# Patient Record
(demographics unavailable — no encounter records)

---

## 2025-02-04 NOTE — HISTORY OF PRESENT ILLNESS
[Home] : at home, [unfilled] , at the time of the visit. [Other Location: e.g. Home (Enter Location, City,State)___] : at [unfilled] [Verbal consent obtained from patient] : the patient, [unfilled] [FreeTextEntry8] : 34 y F multiple reconstructive sinus surgeries, family history of stroke in younger brother at the age of 26 (unclear etiology, but suspectedto be caused by COVID-19), recently d/c from [facility] 1/30 after workup for for OS amaurosis fugax/complex migraine/possible TIA, imaging including MRI/CT-A/TTE nonactionable, started on baby ASA, improving clinical course inpatient [facility] 1/29 noted to be +COVID Hypercoagulability w/u shows very mildly elevated coags and mildly reduced Protein S Pt calling to discuss lab result Separately, patient has been experiencing RLE "heaviness" over the last few months, intermittent, seems most prominent when sitting for a long period of times or when she has just had a run.  Feels it mildly right earlier today but not currently.  No changes to sensation, no motor weakness, extremity is never cool to touch.  Feels symptoms in buttock/posterior thigh.  No saddle anesthesia or b/b changes.  No changes to her gait.  No calf/foot pain.  RLE is not swollen.

## 2025-02-04 NOTE — PLAN
[FreeTextEntry1] : C/W ASA 81 Neuro appt on 2/13 States plan in place with PCP to repeat labs and decide utility of heme f/u from there Strict ED precautions discussed

## 2025-02-04 NOTE — PHYSICAL EXAM
[de-identified] : PLEASE SEE HPI FOR ADDITIONAL RELEVANT PHYSICAL EXAM FINDINGS GENERAL: no acute distress, nontoxic HEAD: normocephalic EYES: no scleral icterus NECK: trachea midline, Full ROM RESP: no respiratory distress ABD: nondistended MSK: no gross deformity NEURO: alert & fully oriented SKIN: no rash PSYCH: cooperative, good insight, appropriate, fluent speech

## 2025-02-04 NOTE — ASSESSMENT
[FreeTextEntry1] : Mild lab abnormalities as above; resolved ophth changes, subacute RLE sensation intermittent/positional seems more associated with sciatica than vascular insufficiency.  Asymptomatic currently.  No indication for referral to ED at this time.

## 2025-02-07 NOTE — ASSESSMENT
[FreeTextEntry1] : 35yo with PMH of multiple reconstructive sinus surgery, recent admission for OS amaurosis fugax/complex migraine/possible TIA, presents for initial evaluation of thyroid nodules and abnormal TFTs.   #Hypothyroid vs Abnormal TFTs in setting of viral illness - Asymptomatic - TSH: 4.47, Free T4: 0.912 in hospital while + for Covid.  - Family Hx of thyroid cancer and hypo/ hyper thyroid  Plan: - Repeat TFTs  - thyroid antibodies  - will call patient with results   #Left Sub centimeter thyroid Nodule - No overt nodule appreciated on physical exam, thyroid not enlarged  - thyroid US   RTC in 3 months or 1 year depending on labs/ US  Iris Teague Endocrinology Fellow  Discussed with Dr. Aragon

## 2025-02-07 NOTE — END OF VISIT
[] : Fellow [FreeTextEntry3] : 34yoF h/o multiple reconstructive sinus surgeries, admitted in Jan 2025 for acute vision loss thought to be due to TIA vs. amaurosis fugax. CTA neck showed a left subcentimeter thyroid nodule. TSH 4.74, free T4 0.912 inpatient. Her grandmother had thyroid cancer.   Exam today shows a well-appearing young woman. Thyroid was not enlarged and there were no palpable nodules.   A/P: Agree with checking thyroid US and rechecking TFTs.

## 2025-02-07 NOTE — PHYSICAL EXAM
[Alert] : alert [No Acute Distress] : no acute distress [Normal Sclera/Conjunctiva] : normal sclera/conjunctiva [Normal Hearing] : hearing was normal [No Neck Mass] : no neck mass was observed [No LAD] : no lymphadenopathy [Thyroid Not Enlarged] : the thyroid was not enlarged [No Respiratory Distress] : no respiratory distress [No Accessory Muscle Use] : no accessory muscle use [Clear to Auscultation] : lungs were clear to auscultation bilaterally [Normal S1, S2] : normal S1 and S2 [Regular Rhythm] : with a regular rhythm [No Edema] : no peripheral edema [Normal Bowel Sounds] : normal bowel sounds [Soft] : abdomen soft [No Stigmata of Cushings Syndrome] : no stigmata of Cushings Syndrome [Normal Gait] : normal gait [No Joint Swelling] : no joint swelling seen [Oriented x3] : oriented to person, place, and time

## 2025-02-07 NOTE — HISTORY OF PRESENT ILLNESS
[FreeTextEntry1] : 35yo with PMH of multiple reconstructive sinus surgery, recent admission for OS amaurosis fugax/complex migraine/possible TIA, presents for initial evaluation of thyroid nodules and abnormal TFTs.   She was admitted at [facility] for brief vision loss that self- resolved suspected to be amaurosis fugax/complex migraine/possible TIA with MRI/ CTH/ CTA unremarkable. Started on aspirin 81mg qd. Covid positive.   Symptoms: No significant weight changes, no cold intolerance, no palpitations, hair loss 2 years ago but not anymore, denies lethargy.   In the hospital: 1/30/25 a1c 4.8 TSH: 4.4740  Free T4: 0.912 Lipid profile: LDL 58 On CTA noted to have: Sub centimeter left thyroid nodule. Size not quantified.   Family History: Grandmother paternal thyroid cancer s/p thryoidectomy, Uncle with pre-cacnerous s/p thyroidectomy, Aunts with hyper and hypo. Father with no thyroid issues.  Brother: Cryptogenic stroke  Sister: Celiac   Social:  for tech company Alcohol: denies Smoking denies:  Medications: baby aspirin  no vitamins over the counter, occasionally vitamin c or zinc

## 2025-02-07 NOTE — REVIEW OF SYSTEMS
[Fatigue] : no fatigue [Decreased Appetite] : appetite not decreased [Dry Eyes] : no dryness [Dysphagia] : no dysphagia [Neck Pain] : no neck pain [Dysphonia] : no dysphonia [Nasal Congestion] : no nasal congestion [Chest Pain] : no chest pain [Slow Heart Rate] : heart rate is not slow [Palpitations] : no palpitations [Fast Heart Rate] : heart rate is not fast [Shortness Of Breath] : no shortness of breath [Cough] : no cough [Nausea] : no nausea [Constipation] : no constipation [Vomiting] : no vomiting [Diarrhea] : no diarrhea [Joint Pain] : no joint pain [Muscle Weakness] : no muscle weakness [Acanthosis] : no acanthosis  [Acne] : no acne [Dry Skin] : no dry skin [Hirsutism] : no hirsutism [Hair Loss] : no hair loss [Ulcer] : no ulcer [Headaches] : no headaches [Tremors] : no tremors [Polydipsia] : no polydipsia [Cold Intolerance] : no cold intolerance [Heat Intolerance] : no heat intolerance [Hot Flashes] : no hot flashes

## 2025-02-12 NOTE — HISTORY OF PRESENT ILLNESS
[FreeTextEntry2] : Patient is a 34YF with PMH of reconstructive sinus surgery (all R sided) and HSV 1 (gets cold sores) she is presenting after hospitalization for TIA/complex migraine workup. She went to the hospital at the end of Jan after an episode of vision loss in her L eye while watching TV. TTE with bubble study was negative, Coagulopathy workup was sent. She was found to be COVID positive at the hospital. She initially went to the hospital as she was concerned for stroke as her younger brother suffered a stroke at 26 years old and was incidentally also covid positive at the time. Today she reports some neck stiffness from sleeping in an odd position. Denies headache, vision loss, floaters, weakness, N/V/D/C or abdominal pain.  FH: Mom: diabetes; colon polypectomies in her late 30s Paternal grandfather: passed of CRC in his 50s father side: NA    pt is here post hospitalization discharge follow u[. Sje

## 2025-02-12 NOTE — PHYSICAL EXAM
[No Acute Distress] : no acute distress [Well Nourished] : well nourished [Well Developed] : well developed [Well-Appearing] : well-appearing [EOMI] : extraocular movements intact [No JVD] : no jugular venous distention [No Respiratory Distress] : no respiratory distress  [Normal] : normal rate, regular rhythm, normal S1 and S2 and no murmur heard [No Edema] : there was no peripheral edema [Soft] : abdomen soft [Non Tender] : non-tender [Non-distended] : non-distended [No Masses] : no abdominal mass palpated [No HSM] : no HSM [Normal Bowel Sounds] : normal bowel sounds [Grossly Normal Strength/Tone] : grossly normal strength/tone [Coordination Grossly Intact] : coordination grossly intact [No Focal Deficits] : no focal deficits [Normal Gait] : normal gait [Normal Affect] : the affect was normal [Normal Insight/Judgement] : insight and judgment were intact [de-identified] : 5/5 strength testing in b/l UE and LE

## 2025-02-12 NOTE — END OF VISIT
[] : Resident [FreeTextEntry3] : Admitted  discharged . Recent episode of visual disturbance which raised concern for stroke. UC -> ED -> admit for hypercoag workup. Also found to be COVID+. Discharged on aspirin and sent to endo for eval of incidental thyroid nodule.  FHx early stroke in her brother at age 26. Will get lp(a) eventually given this FHx but no need to draw this now. Also FHx of many types of cancer including colorectal and thyroid but no genetic cause identified. Chronic headaches thought to be related to sinus surgeries -- currently at baseline. She is staying on aspirin until following up with neuro, which seems reasonable to me. Her headaches are better which could be due to mild anti-inflammatory from aspirin. LDL<70 and no clear reason to push for LDL<55.  HCM -- UTD on cervical cancer screening and vaccines. GI referral given mother with colonic polyps and MGF  of colon cancer

## 2025-02-12 NOTE — REVIEW OF SYSTEMS
[Fever] : no fever [Chills] : no chills [Pain] : no pain [Vision Problems] : no vision problems [Earache] : no earache [Chest Pain] : no chest pain [Palpitations] : no palpitations [Wheezing] : no wheezing [Cough] : no cough [Abdominal Pain] : no abdominal pain [Nausea] : no nausea [Constipation] : no constipation [Diarrhea] : diarrhea [Vomiting] : no vomiting [Dysuria] : no dysuria [Headache] : no headache [Dizziness] : no dizziness [FreeTextEntry9] : R leg jheaviness and tightness around hamstring

## 2025-02-12 NOTE — PHYSICAL EXAM
[No Acute Distress] : no acute distress [Well Nourished] : well nourished [Well Developed] : well developed [Well-Appearing] : well-appearing [EOMI] : extraocular movements intact [No JVD] : no jugular venous distention [No Respiratory Distress] : no respiratory distress  [Normal] : normal rate, regular rhythm, normal S1 and S2 and no murmur heard [No Edema] : there was no peripheral edema [Soft] : abdomen soft [Non Tender] : non-tender [Non-distended] : non-distended [No Masses] : no abdominal mass palpated [No HSM] : no HSM [Normal Bowel Sounds] : normal bowel sounds [Grossly Normal Strength/Tone] : grossly normal strength/tone [Coordination Grossly Intact] : coordination grossly intact [No Focal Deficits] : no focal deficits [Normal Gait] : normal gait [Normal Affect] : the affect was normal [Normal Insight/Judgement] : insight and judgment were intact [de-identified] : 5/5 strength testing in b/l UE and LE

## 2025-02-12 NOTE — ASSESSMENT
[FreeTextEntry1] :   Case discussed with Dr. Christiansen RTC in 3 months for follow-up to review workup for TIA/complex migraine

## 2025-02-12 NOTE — HEALTH RISK ASSESSMENT
[Monthly or less (1 pt)] : Monthly or less (1 point) [Never (0 pts)] : Never (0 points) [Patient reported PAP Smear was normal] : Patient reported PAP Smear was normal [Fully functional (bathing, dressing, toileting, transferring, walking, feeding)] : Fully functional (bathing, dressing, toileting, transferring, walking, feeding) [Never] : Never [PapSmearDate] : 2024

## 2025-02-12 NOTE — REASON FOR VISIT
I printed order  
Order faxed to [facility] #[phone]  
PATIENT CALLED AND STATED SHE IS REQUESTING TO SEE  PHYSICAL THERAPIST CRISTOFER GALLAGHER   THERAPIST IS AT [place] [facility]  
[Post Hospitalization] : a post hospitalization visit
[Post Hospitalization] : a post hospitalization visit

## 2025-02-13 NOTE — HISTORY OF PRESENT ILLNESS
[FreeTextEntry1] : Diana Santos is 34-year-old Female with PMH of multiple reconstructive sinus surgeries, family history of stroke in younger brother at the age of 26 (unclear etiology but suspected to be caused by COVID-19) presented to the ED for evaluation of L eye vision loss for 30-45 minutes followed by R temporal headache. HCT negative for hemorrhage and territorial infarct. CTA head and neck negative for stenosis, occlusion, and dissection. Patient admitted for stroke work up. Course complicated by panic attack causing L hand and tongue tingling. Repeated CTH is negative for stroke and hemorrhage. MRI negative for acute stroke. TTE completed and negative for PFO and thrombus. Hypercoagulability work-up negative. Patient presents to the office for post-hospitalization follow-up.  Hospital Course: January 29-January 30, 2025, Seen by Dr. Roach Imaging: CT Head: Negative for acute stroke and hemorrhage MR Head Non-Contrast: negative for stroke CT Angio Head: negative for dissection, aneurysm, stenosis and occlusion. CT Angio Neck: negative for dissection, aneurysm, stenosis and occlusion. Echo: no PFO or thrombus. LVEF: 60% Hospital labs    - LDL: 58, A1C: 4.8, TSH: 4.740    - hypercoag panel: negative, mildly decreased protein S, Covid +  Stroke symptoms: patient reports lying in bed watching TV, sudden onsent of left blurry vision. She checked both eyes and only left side was blurry. She turned the light on and off and this lasted for 30-45 minutes then she had a sudden loss of vision, seeing black in her left eye correction which only last few minutes then a headache.   Since discharge, patient reports doing well. No reports of new neurological symptoms. She is tolerating and compliant with her ASA daily. She is very active and eats a healthy diet. She was never a smoker, rarely drinks alcohol and does not use recreational drugs. Her BP today is 112/79. She reports good sleep, no snoring noted. She recently followed up for her abnormal TFTs, which were repeated and back to normal. She has some congestion left but pretty much feeling better. She has a hx of sinus surgeries and chronic sinusitis. She does have frequent frontal forehead headaches from the sinus issues, but she has never had visual symptoms with it.  Family History: brother stroke age 26 (unable to figure out cause, possibly d/t covid in 12/2019), colon cancer mom side, breast cancer dad side, sister has celiac, paternal grandfather 2 MI's, paternal uncle stroke late 50s, mother had 2 miscarriages (during 1st trimester)

## 2025-02-13 NOTE — PHYSICAL EXAM
[FreeTextEntry1] : Alert. Fully oriented. Speech and language are intact. Cranial nerves II-XII are intact. Motor exam reveals intact strength with individual muscle testing in bilateral upper and lower extremities. Tone is normal. Reflexes are normal. Sensation is intact to light touch in distal extremities. Finger-to-nose and heel-to-shin are intact. Rapid alternating movements are normal in the upper and lower extremities. Gait is normal. Tandem walk intact. Romberg negative

## 2025-02-13 NOTE — HISTORY OF PRESENT ILLNESS
[FreeTextEntry1] : Diana Santos is 34-year-old Female with PMH of multiple reconstructive sinus surgeries, family history of stroke in younger brother at the age of 26 (unclear etiology but suspected to be caused by COVID-19) presented to the ED for evaluation of L eye vision loss for 30-45 minutes followed by R temporal headache. HCT negative for hemorrhage and territorial infarct. CTA head and neck negative for stenosis, occlusion, and dissection. Patient admitted for stroke work up. Course complicated by panic attack causing L hand and tongue tingling. Repeated CTH is negative for stroke and hemorrhage. MRI negative for acute stroke. TTE completed and negative for PFO and thrombus. Hypercoagulability work-up negative. Patient presents to the office for post-hospitalization follow-up.  Hospital Course: January 29-January 30, 2025, Seen by Dr. Roach Imaging: CT Head: Negative for acute stroke and hemorrhage MR Head Non-Contrast: negative for stroke CT Angio Head: negative for dissection, aneurysm, stenosis and occlusion. CT Angio Neck: negative for dissection, aneurysm, stenosis and occlusion. Echo: no PFO or thrombus. LVEF: 60% Hospital labs    - LDL: 58, A1C: 4.8, TSH: 4.740    - hypercoag panel: negative, mildly decreased protein S, Covid +  Stroke symptoms: patient reports lying in bed watching TV, sudden onsent of left blurry vision. She checked both eyes and only left side was blurry. She turned the light on and off and this lasted for 30-45 minutes then she had a sudden loss of vision, seeing black in her left eye skilled nursing which only last few minutes then a headache.   Since discharge, patient reports doing well. No reports of new neurological symptoms. She is tolerating and compliant with her ASA daily. She is very active and eats a healthy diet. She was never a smoker, rarely drinks alcohol and does not use recreational drugs. Her BP today is 112/79. She reports good sleep, no snoring noted. She recently followed up for her abnormal TFTs, which were repeated and back to normal. She has some congestion left but pretty much feeling better. She has a hx of sinus surgeries and chronic sinusitis. She does have frequent frontal forehead headaches from the sinus issues, but she has never had visual symptoms with it.  Family History: brother stroke age 26 (unable to figure out cause, possibly d/t covid in 12/2019), colon cancer mom side, breast cancer dad side, sister has celiac, paternal grandfather 2 MI's, paternal uncle stroke late 50s, mother had 2 miscarriages (during 1st trimester)

## 2025-02-13 NOTE — ASSESSMENT
[FreeTextEntry1] : Diana Santos is 34-year-old Female with PMH of multiple reconstructive sinus surgeries, family history of stroke in younger brother at the age of 26 (unclear etiology but suspected to be caused by COVID-19) presented to the ED for evaluation of L eye vision loss for 30-45 minutes followed by R temporal headache. Stroke work-up negative (MRI, CTH, CTA H/N, TTE w/ bubble and hypercoag negative). Patient presents to the office for post-hospitalization follow-up. Etiology of symptoms suspicious of TIA, especially with visual loss in one eye.   Plan: -Continue current medication regimen for secondary stroke prevention, monotherapy with ASA indefinitely -Repeat stroke labs in 3 months -F/u with hematology for hypercoag workup (especially with family hx) -F/u PCP/endocrine for thyroid mass -Continue aggressive vascular risk factor control with PCP, BP goal <130/80, LDL <70. -Counselled on healthy eating (DASH/Mediterranean diet, limiting red meats and fried foods) -Counselled on importance of regular exercise and remaining active -Counselled on f/u with PCP regarding regular health maintenance and prevention, including routine screening -Counselled on signs of stroke BEFAST and to call 911 with any new or worsening neurological symptoms -RTC in 3 months with Dr. Roach

## 2025-03-10 NOTE — HISTORY OF PRESENT ILLNESS
[de-identified] : 34F with medical history of sinsitis 1/29/25 with concern for stroke Run 8-9miles on Sunday No hx of VTEs No recent hx OCPs  COVID while at [facility].    Family hx: brother (major stroke in 2019 age 29- workup done negative), dad cousin (stroke at 58) Surgery: sinus surgery Medications: aspirin, allegra PRN

## 2025-03-27 NOTE — HISTORY OF PRESENT ILLNESS
[FreeTextEntry1] : 35 yo F with covid infection, multiple reconstructive sinus surgeries, amaurosis fugax, TIA and family history of stroke who presents for EP evaluation.   Patient was admitted in Jan 2025 with L eye vision loss for 30-45% followed by headache. CTH, CTA and MRI brain were all negative for stroke. Course was complicated by panic attack causing L hand and tongue tingling which then resolved. TTE from Jan 2025 showed normal structure, EF, negative for PFO and thrombus. Hypercoagulability workup was negative. She is currently on ASA daily. She underwent an EM for arrhythmia workup which did not show any arrhythmias.   Today, she presents to clinic. She denies any further neurological symptoms, denies syncope, dizziness, palpitation.   Younger brother had stroke s/p ILR at age 26  EKG 3/27/25: NSR

## 2025-03-27 NOTE — ASSESSMENT
[FreeTextEntry1] : 35 yo F with covid infection, multiple reconstructive sinus surgeries, amaurosis fugax, TIA and family history of early stroke who presents for EP evaluation. Her CT/MRI were all negative for stroke. TTE showed normal structures and EF, negative for bubble test. Her EM was also negative for arrhythmia. EKG today showed NSR.   We discussed that in setting of TIA, arrhythmia evaluation is also a critical component, especially given her young age and the TIA history. Following a negative EM, I recommend an ILR which she is familiar with as her brother had one after his stroke. We discussed that this is a low risk procedure, involving potentially minor bleeding, hematoma and infection, device migrating. It will help to monitor her cardiac rhythm continuously for about 3-4 years. If AF/AFL is detected, she will be switched from asa to Eliquis. She is agreeable to it.   Followup in device clinic in 2 weeks for wound/device check 2 weeks after ILR implant.

## 2025-03-27 NOTE — REVIEW OF SYSTEMS
[Fever] : no fever [Chills] : no chills [SOB] : no shortness of breath [Dyspnea on exertion] : not dyspnea during exertion [Chest Discomfort] : no chest discomfort [Lower Ext Edema] : no extremity edema [Palpitations] : no palpitations [Syncope] : no syncope [Rash] : no rash [Dizziness] : no dizziness [Confusion] : no confusion was observed [Easy Bleeding] : no tendency for easy bleeding

## 2025-03-27 NOTE — PHYSICAL EXAM
[Well Developed] : well developed [Well Nourished] : well nourished [No Acute Distress] : no acute distress [Normal Conjunctiva] : normal conjunctiva [Normal S1, S2] : normal S1, S2 [Clear Lung Fields] : clear lung fields [Soft] : abdomen soft [Normal Gait] : normal gait [No Edema] : no edema [No Rash] : no rash [Moves all extremities] : moves all extremities [Alert and Oriented] : alert and oriented

## 2025-05-12 NOTE — ASSESSMENT
[FreeTextEntry1] : Diana Santos is 34-year-old Female with PMH of multiple reconstructive sinus surgeries, younger brother stroke at the age of 26 (unclear etiology but suspected to be caused by COVID-19) who presents for follow-up for amaurosis fugax/TIA (January 29, 2025).  We discussed inconclusive TTE with bubble results and that a CASSANDRA is the most accurate to r/o PFO. She was recommended cardiac MRI per [facility] and she wishes to do it with us instead. Will reach out to cardio to get their opinion but would recommend CASSANDRA instead  Plan: -Continue current medication regimen for secondary stroke prevention, monotherapy with ASA indefinitely -Stroke labs today -F/u with Dr. Dunbar for ILR placement to r/o afib -Continue aggressive vascular risk factor control with PCP, BP goal <130/80, LDL <70. -Counselled on healthy eating (DASH/Mediterranean diet, limiting red meats and fried foods) -Counselled on importance of regular exercise and remaining active -Counselled on f/u with PCP regarding regular health maintenance and prevention, including routine screening -Counselled on signs of stroke BEFAST and to call 911 with any new or worsening neurological symptoms -RTC with Dr. Roach

## 2025-05-12 NOTE — HISTORY OF PRESENT ILLNESS
[FreeTextEntry1] : Diana Santos is 34-year-old Female with PMH of multiple reconstructive sinus surgeries, younger brother stroke at the age of 26 (unclear etiology but suspected to be caused by COVID-19) who presents for follow-up for amaurosis fugax/TIA (January 29, 2025).  Since last visit, patient reports doing much better. She had heightened anxiety after hospitalization but reports everything is much better now. She saw cardiology (Dr. Dunbar) who is recommending ILR, 30-day holter negative for any events. In addition, hematology work-up was unrevealing. She also went to [facility] for a second opinion, repeat TTE with bubble was also inconclusive. other normal labs included d-dimer, lipo A and RPR. She also reported long covid symptoms that has just gotten better the last few weeks, including increased SOB and some lightheadedness after exercises, usually around her menstrual period. She started taking iron pills last month.  Otherwise, she denies new stroke-like symptoms. She is compliant with ASA daily without reported side effects. She remains eating a healthy diet as well as very active (running). Her BP today is 108/74. Her appetite is good, and sleep is very good. She was prescribed Xanax for panic attack-never used and now switched to Klonopin. Again, she has not used it. Her headaches have improved or gone away since she started taking daily ASA.

## 2025-05-12 NOTE — HISTORY OF PRESENT ILLNESS
[FreeTextEntry1] : Diana Santos is 34-year-old Female with PMH of multiple reconstructive sinus surgeries, younger brother stroke at the age of 26 (unclear etiology but suspected to be caused by COVID-19) who presents for follow-up for amaurosis fugax/TIA (January 29, 2025).  Since last visit, patient reports doing much better. She had heightened anxiety after hospitalization but reports everything is much better now. She saw cardiology (Dr. Dunbar) who is recommending ILR, 30-day holter negative for any events. In addition, hematology work-up was unrevealing. She also went to [facility] for a second opinion, repeat TTE with bubble was also inconclusive. other normal labs included d-dimer, lipo A and RPR. She also reported long covid symptoms that has just gotten better the last few weeks, including increased SOB and some lightheadedness after exercises, usually around her menstrual period. She started taking iron pills last month.  Otherwise, she denies new stroke-like symptoms. She is compliant with ASA daily without reported side effects. She remains eating a healthy diet as well as very active (running). Her BP today is 108/74. Her appetite is good, and sleep is very good. She was prescribed Xanax for panic attack-never used and now switched to Klonopin. Again, she has not used it. Her headaches have improved or gone away since she started taking daily ASA.

## 2025-05-27 NOTE — PLAN
[FreeTextEntry1] : #Leukopenia  Unclear etiology and significance.  Does not appear to be infection or medication related. High eos could reflect allergies with a relative decrease in neutrophils?  - Will repeat CBC with diff today to assess whether labs are back within normal range.  - If abnormalities persist, discussed she'll need follow up with hematology for further investigation.   RTC in 3 months for f/u

## 2025-05-27 NOTE — REVIEW OF SYSTEMS
[Dizziness] : dizziness [Negative] : Heme/Lymph [de-identified] : Dizziness for 1-2 days when she is on her period (denies menorrhagia)

## 2025-05-27 NOTE — END OF VISIT
[] : Resident [FreeTextEntry3] : 34-year-old female with history of TIA in January of this year presenting for follow-up. recent CBC showed mild leukopenia and elevated eosinophil percentage.  She does note very active seasonal allergies, otherwise no new medications other than intermittent benzodiazepine for severe anxiety.  Saw hematology a few months ago for complete hypercoagulability workup which was reportedly normal.  Well-appearing on exam.  Will repeat CBC today, if remains abnormal will refer back to hematology for further evaluation.

## 2025-05-27 NOTE — HEALTH RISK ASSESSMENT
[Little interest or pleasure doing things] : 1) Little interest or pleasure doing things [Feeling down, depressed, or hopeless] : 2) Feeling down, depressed, or hopeless [0] : 2) Feeling down, depressed, or hopeless: Not at all (0) [PHQ-9 Negative - No further assessment needed] : PHQ-9 Negative - No further assessment needed [Never] : Never [[id number]] : 0

## 2025-05-27 NOTE — HISTORY OF PRESENT ILLNESS
[FreeTextEntry1] : pt here for follow up [de-identified] : Patient is a 34-year-old female with PMHx of TIA, amaurosis fugax, multiple reconstructive sinus surgeries, family history of stroke in younger brother at the age of 26 (unclear etiology but suspected to be caused by COVID-19) presenting for a follow up. She saw cardiology (Dr. Dunbar), ILR placed last week, 30-day Holter was previously negative for any events. She went to [facility] (where her brother was hospitalized for his stroke) for a second opinion given her initial TTE with bubble was inconclusive. AT [facility], her repeat TTE with bubble was also inconclusive. Plans to undergo CASSANDRA.  In addition, hematology work-up was unrevealing.  Thyroid US revealed 1cm X 0.5cm X0.7cm solid isoechoic nodule in left upper pole. No FNA recommended, plan for f/u with Endo in 1 yr for repeat TFTs and thyroid US.  WBC 1/1/25 4.89, 1/30/25 5.24, 5/12/25 decreased to 3.4 with eos increased to 7.6% and neut decreased to 1.66.  New medication - Klonapin for anxiety 0.5 mg, prescribed 2 weeks ago by her psychiatrist. She has taken half tab only 2-3 times. Prior to labs had only taken half of 0.5 mg once. Doesn't want to take meds every day. Has alot of health anxiety and want to try getting through the next couple months w/o any meds. Mentioned she had been training for a half marathon when labs were collected, however unusual for that to cause leukopenia with high eos and low neut.

## 2025-06-05 NOTE — DISCUSSION/SUMMARY
[FreeTextEntry1] : 34 year old female  multiple reconstructive sinus surgeries, amaurosis fugax, TIA and family history of stroke s/p ILR who presents for wound check.  ILR incision is well healed.  Home monitoring working.  Interrogation with no arrhythmias.  We discussed how home monitoring works and the limitations of this technology.  She will follow up in 1 year or sooner if needed.  She knows to call with any questions or concerns.

## 2025-06-05 NOTE — HISTORY OF PRESENT ILLNESS
[de-identified] : 34 year old female  multiple reconstructive sinus surgeries, amaurosis fugax, TIA and family history of stroke s/p ILR who presents for wound check  She has no device related procedures.  No chest pain, palpitations, syncope or device related issues.

## 2025-06-05 NOTE — REVIEW OF SYSTEMS
[Fever] : no fever [Weight Gain (___ Lbs)] : no recent weight gain [Chills] : no chills [Weight Loss (___ Lbs)] : no recent weight loss [SOB] : no shortness of breath [Dyspnea on exertion] : not dyspnea during exertion [Chest Discomfort] : no chest discomfort [Palpitations] : no palpitations [Syncope] : no syncope [Negative] : Heme/Lymph

## 2025-06-13 NOTE — DATA REVIEWED
[FreeTextEntry1] : 06/13/2025  Cervical Spine XR [6 views, AP, Lateral, Left/right Obliques, Flex, Ext]:   Indication: Pain   Technique: 6 view/s of the Cervical spine.   Findings: The Cervical vertebrae are visualized from C1-C7. No acute fracture is identified. The vertebral body and disc space heights are generally maintained. no Multilevel facet degeneration noted . stepwise retrolisthesis C2-C6 persistent with flexion extension.  The Odontoid process is intact. There is no pre-vertebral soft tissue swelling. There is loss of cervical lordosis.   Impression: spondylolisthesis multilevel

## 2025-06-13 NOTE — PHYSICAL EXAM
[FreeTextEntry1] : Gen: A+O x 3 in NAD Psych: Normal mood and affect. Responds appropriately to commands Eyes: Anicteric. No discharge. EOMI. Resp: Breathing unlabored CV: radial pulses 2+ and equal. No varicosities noted Ext: No c/c/e Skin: No lesions noted   + right scapula dyskinesis type 1 weakness in prone and lateral recumbent position  Gait: Non antalgic +  reciprocating heel to toe able to stand on toes and heels WITH hand holding/holding onto counter with both hands Tandem gait intact WITH hand holding Romberg negative   CN2-12 grossly intact   Inspection: Spine alignment is midline, with no evidence of scoliosis. Iliac crest heights and PSIS heights level.   + Forward head position.   Palpation: There is + tenderness over the upper traps, levator scaps, rhomboids, articular pillars, cervical paraspinals, B/L There is + tenderness middle traps, latissimus dorsi, serratus anterior, B/L   Cervical ROM: Flexion, extension, side-bending, rotation, limited in most planes with pain at terminal ROM Finger to nose bilaterally intact    C5 (Shoulder Abduction)        C5 (Elbow Flex)         C6 (Wrist Ext) Right 5/5                                 5/5                           5/5      Left 5/5                                 5/5                           5/5            C7 (Elbow Ext)            C7 (Wrist Flex)             C8 (Long Finger Flex)          T1 (Finger Abduct)         Right 5/5                     5/5                                      5/5                                       5/5                                                Left 5/5                     5/5                                      5/5                                       5/5                                 C8 (Thumb Ext)            C8 & T1 (Thumb Opp)          Right 5/5                            5/5                                                Left 5/5                            5/5                               Tone: Normal. No cog wheeling. Proprioception at DIPs intact B/L Sensation: Grossly intact to light touch and pinprick bilateral upper extremities. Reflexes: 2+ symmetric biceps, pronators, brachioradialis, triceps Hoffmans Sign negative Spurling's Sign negative Shoulder Abduction Test (Bakody's) absent Lhermittes Sign negative   Resisted Internal Rotation Right 4-/5 Left 4/5   Resisted External Rotation   Right 4-/5 Left 4/5   Prone Resisted Shoulder Extension   Right 4-/5 Left 4/5   Prone Resisted Shoulder External Rotation Right 3+/5 Left 4/5   Prone Resisted Shoulder (Coronal) Abduction Right 3+/5 Left 4/5   (Contra)Lateral Recumbent Empty Can of  *** Shoulder Pain: neg Motor strength: 4-/5   (Contra)Lateral Recumbent Resisted External Rotation of *** Shoulder Pain: neg Motor strength: 4-/5   (Contra)Lateral Recumbent Resisted Internal Rotation of *** Shoulder Pain: neg Motor strength: 4-/5

## 2025-06-13 NOTE — ASSESSMENT
[FreeTextEntry1] :                                                       Assessment/Plan:   AARON DENISE is a 34 year female with right sided neck pain here for initial consultation. - Tiers of treatment and management of above diagnosis(es) were discussed with patient - Optimal diet, weight, sleep, and lifestyle management to minimize stress and maximize well being counseling provided - Imaging reviewed and discussed with patient - Patient was advised to start a structured, targeted therapy program 2-3x/wk for 8 wks with goal toward HEP - Patient was educated on an appropriate home exercise program, provided with exercise recommendations, all questions answered - 06/13/2025 extensively reviewed HEP program, specific exercise recommendations including discussion of positioning/equipment, pace/tempo, repetitions, attention/limb awareness, and sets per body part - Patient was prescribed methocarbamol 750mg 1-2 tabs up to TID PRN muscle spasm, informed of sedative potential, advised to avoid driving, taking the subway, or operating heavy machinery, patient displayed a clear understanding of the plan including medication, its purpose and side effects, all questions answered - Jun 13, 2025 give loop recorder deferred prescription topical, may trial OTC lidocaine patches - Patient was advised to apply cool compresses or warm heat to affected regions PRN - Radiographs of cervical spine ordered 06/13/2025    - Educated about red flag symptoms including (but not limited to) new, worsened, or persistent: fever greater than 100F, bowel or bladder incontinence, bowel obstipation, inability to void urine, urinary leakage, Severe nausea or vomiting, Worsening numbness, worsening tingling/paresthesias, and/or new or progressive motor weakness; advised to seek immediate medical attention at his nearest Emergency department should they experience any of the above   - Patient relates having minimal interest in locally directed treatment of their condition at this time, they were counseled on the role for local treatment as part of the tiers of treatment for their condition, all questions answered    - 06/13/2025 MRI cervical spine without contrast is indicated given that the pt has not improved with tylenol, ibuprofen, naproxen, meloxicam, they underwent non-diagnostic radiographic imaging of the region Jun 13, 2025, stepwise C2-C5 retrolisthesis persistent with extension reduces with flexion, persistent 6 month right C4, C5 dermatome pain with focal right scapular weakness, and physical therapy/home exercise program>6 weeks. Patient's imaging is medically necessary to outline targets for locally (interventional) directed treatments and/or guide surgical management.   - Follow up in 2-3 weeks after imaging, in person in 2 months to assess their progress   I have personally spent a total of at least 60 minutes preparing, reviewing internal and external records, explaining, counseling, providing necessary information via documented paperwork for this encounter, and coordinating care for this patient encounter.     Thank you, Dr(s), for allowing me to participate in the care of your patient. Please do not hesitate to contact me with questions/concerns.   Tor Ann M.D. Sports and Spine Department of Physical Medicine and Rehabilitation [facility] Physician [facility] [address] [place], [place] [address]   Appointments: [phone] Fax: [phone]

## 2025-06-13 NOTE — HISTORY OF PRESENT ILLNESS
[FreeTextEntry1] : Tor Ann M.D. Sports and Spine Medicine Department of Physical Medicine and Rehabilitation [facility] [address] [facility], [address] [place] [address]   [facility] [place] at [facility] [address], [address] [place] [address]   For [place] Appointments Phone: [phone] Fax: [phone]   ----------------------------------------------------------------------------------------------------------------------------------------   PATIENT: AARON DENISE MRN: [id number] YOB: 1990 DATE OF SERVICE: 06/13/2025 Jun 13, 2025    Dear Drs.   Thank you for referring AARON DENISE to my Sports and Spine practice and office. Enclosed is a copy of the patient's consultation/progress note, which includes my complete assessment and recent studies completed during the patient's evaluation. If you have questions or have any patients who require nonsurgical, non-opiate management of any sports, spine, or musculoskeletal conditions, please do not hesitate to contact my  at [phone]. I look forward to taking care of your patients along with you.   Sincerely,   Tor Ann MD Sports, Spine, & Regenerative Musculoskeletal Medicine Orthopaedic [facility]                                                     Initial Consultation:   CC: neck pain    HPI:  This is the first visit to [facility] [place] at [facility].     AARON DENISE presents with the chief complaint as above.   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 The patients difficulties began as above The pain is graded as 1-7/10 last 7/10 rated pain was 6/8/2025 stiffness, pain occur and last different amount of time points to the right upper trapezius The pain is described as stiff : dull throbbing; sharp pain at times The pain does not radiate The patient feels that the pain is overall persistent Patient denies other recent fall, MVA, injury, trauma, or accident besides presenting history above   Aggravating: ambulation, prolonged sitting, prolonged standing, navigating stairs, getting out of bed, sit to stand transitional movements Alleviating: rest, activity modification, pharmacologic treatments as per intake and as above   Meds: denies regular PO pain medications Therapy Program: no recent structured targeted therapy program HEP: doing HEP regularly; HEP consists of greater than four weeks, 5-6 days per week, 10-15 minutes per session Injection Hx: denies locally directed treatment to the area in question Imaging Hx: reviewed   Assoc Sx: Denies numbness, Tingling Denies Focal motor weakness in the upper or lower limbs Denies New or worsened bowel or bladder incontinence Denies Saddle anesthesia Denies Using Orthotic(s)/Supportive devices Denies Swelling in the upper/lower extremities They also deny frequent tripping, falling   ROS: A 14 point review of systems was completed. Positive findings are pain as described above. The remaining systems negative.   COVID HX: reviewed    Assoc Hx: has h/o headaches Ambulates without assistive device Level of functioning: indep with ambulation, indep with ADLs Living Situation: dwelling with steps to enter